# Patient Record
Sex: FEMALE | Race: ASIAN | URBAN - METROPOLITAN AREA
[De-identification: names, ages, dates, MRNs, and addresses within clinical notes are randomized per-mention and may not be internally consistent; named-entity substitution may affect disease eponyms.]

---

## 2017-05-09 ENCOUNTER — OFFICE (OUTPATIENT)
Dept: URBAN - METROPOLITAN AREA CLINIC 56 | Facility: CLINIC | Age: 62
End: 2017-05-09

## 2017-05-09 VITALS — SYSTOLIC BLOOD PRESSURE: 153 MMHG | WEIGHT: 127 LBS | DIASTOLIC BLOOD PRESSURE: 75 MMHG | HEART RATE: 56 BPM

## 2017-05-09 DIAGNOSIS — Z12.11 SCREENING FOR COLONIC NEOPLASIA: ICD-10-CM

## 2017-05-09 DIAGNOSIS — R10.13 EPIGASTRIC PAIN: ICD-10-CM

## 2017-05-09 PROCEDURE — 99203 OFFICE O/P NEW LOW 30 MIN: CPT

## 2017-05-09 NOTE — SERVICEHPINOTES
This is a   62   year old  female   seen   in consultation at the request of Dr. LILI DOBSON  . The patient complains of abdominal pain.    Symptoms began   1 - 3  months   ago with onset that was    intermittent  .    It is localized as   epigastric region   pain.    It is described as   moderate   in nature.   Pain quality is described as   burning  .    It also radiates to the   none  .    Discomfort typically lasts   10 - 15  minutes   and has a course which is   steady  .   It usually starts   intermittently  .    Symptom triggers include   empty stomach  .  Alleviating factors include   wate and milk  .    Symptoms have been   progressive   since onset.    Alarm features noted:   none  .   The patient also reports   none  .      Symptoms have caused the patient to   see primary care physician   Similar symptoms   have   occurred in the past, associated with    unknown  .   Noteworthy prior abdominal interventions include   appendectomy  .  none   has been performed previously to evaluate the patient's symptoms.   Remedies tried to date include    none   with    .    BROther pertinent testing to date includes,   none   BR   Patient presents for a screening colonoscopy. There has been no previous colon screening. The patient has no family history of colon cancer or other significant GI diseases. Patient denies fever, nausea, vomiting, dysphagia, reflux, abdominal pain, change in bowel habits, constipation, diarrhea, rectal bleeding, melena, and significant change in weight. Denies shortness of breath and chest pain.

## 2018-06-26 ENCOUNTER — HOSPITAL ENCOUNTER (OUTPATIENT)
Dept: HOSPITAL 91 - GIL | Age: 63
Discharge: HOME | End: 2018-06-26
Payer: COMMERCIAL

## 2018-06-26 ENCOUNTER — HOSPITAL ENCOUNTER (OUTPATIENT)
Age: 63
Discharge: HOME | End: 2018-06-26

## 2018-06-26 DIAGNOSIS — K29.50: Primary | ICD-10-CM

## 2018-06-26 DIAGNOSIS — E11.9: ICD-10-CM

## 2018-06-26 DIAGNOSIS — I10: ICD-10-CM

## 2018-06-26 PROCEDURE — 88312 SPECIAL STAINS GROUP 1: CPT

## 2018-06-26 PROCEDURE — 82962 GLUCOSE BLOOD TEST: CPT

## 2018-06-26 PROCEDURE — 43239 EGD BIOPSY SINGLE/MULTIPLE: CPT

## 2018-06-26 PROCEDURE — 88305 TISSUE EXAM BY PATHOLOGIST: CPT
